# Patient Record
Sex: FEMALE | Race: WHITE | URBAN - METROPOLITAN AREA
[De-identification: names, ages, dates, MRNs, and addresses within clinical notes are randomized per-mention and may not be internally consistent; named-entity substitution may affect disease eponyms.]

---

## 2022-01-14 ENCOUNTER — APPOINTMENT (OUTPATIENT)
Dept: URBAN - METROPOLITAN AREA SURGERY 9 | Age: 32
Setting detail: DERMATOLOGY
End: 2022-01-14

## 2022-01-14 DIAGNOSIS — L65.8 OTHER SPECIFIED NONSCARRING HAIR LOSS: ICD-10-CM

## 2022-01-14 DIAGNOSIS — L21.8 OTHER SEBORRHEIC DERMATITIS: ICD-10-CM

## 2022-01-14 PROBLEM — L65.9 NONSCARRING HAIR LOSS, UNSPECIFIED: Status: ACTIVE | Noted: 2022-01-14

## 2022-01-14 PROCEDURE — OTHER COUNSELING: OTHER

## 2022-01-14 PROCEDURE — 99203 OFFICE O/P NEW LOW 30 MIN: CPT

## 2022-01-14 PROCEDURE — OTHER TREATMENT REGIMEN: OTHER

## 2022-01-14 ASSESSMENT — LOCATION DETAILED DESCRIPTION DERM
LOCATION DETAILED: RIGHT SUPERIOR PARIETAL SCALP
LOCATION DETAILED: LEFT MEDIAL FRONTAL SCALP

## 2022-01-14 ASSESSMENT — LOCATION ZONE DERM: LOCATION ZONE: SCALP

## 2022-01-14 ASSESSMENT — LOCATION SIMPLE DESCRIPTION DERM
LOCATION SIMPLE: LEFT SCALP
LOCATION SIMPLE: SCALP

## 2022-01-14 NOTE — HPI: HAIR LOSS
Previous Labs: No
Additional History: Naturally has thin and fine hair. Usually have extensions in for a long period of time. Since taking extensions out she is unsure if that’s why her hair seems thin. But the thinning she has noticed is not where extensions are placed.

## 2022-01-14 NOTE — PROCEDURE: TREATMENT REGIMEN
Samples Given: Head and Shoulders
Initiate Treatment: OTC dandruff shampoo
Detail Level: Detailed
Plan: Can call for prescription shampoo and steroid if wanted but wants to stay conservative for now with OTC shampoos
Initiate Treatment: Rogaine 5%
Discontinue Regimen: Biotin
Plan: Hair loss handout provided; she may have early signs but not classic signs of female pattern hair loss; she has overall thin hair; negative hair pull test